# Patient Record
Sex: MALE | Race: WHITE
[De-identification: names, ages, dates, MRNs, and addresses within clinical notes are randomized per-mention and may not be internally consistent; named-entity substitution may affect disease eponyms.]

---

## 2022-01-26 ENCOUNTER — HOSPITAL ENCOUNTER (OUTPATIENT)
Dept: HOSPITAL 33 - ED | Age: 47
Setting detail: OBSERVATION
LOS: 2 days | Discharge: HOME | End: 2022-01-28
Attending: FAMILY MEDICINE | Admitting: FAMILY MEDICINE
Payer: COMMERCIAL

## 2022-01-26 DIAGNOSIS — Z20.828: ICD-10-CM

## 2022-01-26 DIAGNOSIS — J18.9: Primary | ICD-10-CM

## 2022-01-26 DIAGNOSIS — R09.02: ICD-10-CM

## 2022-01-26 DIAGNOSIS — E66.01: ICD-10-CM

## 2022-01-26 LAB
ALBUMIN SERPL-MCNC: 3.4 G/DL (ref 3.5–5)
ALP SERPL-CCNC: 48 U/L (ref 38–126)
ALT SERPL-CCNC: 21 U/L (ref 0–50)
ANION GAP SERPL CALC-SCNC: 13.9 MEQ/L (ref 5–15)
AST SERPL QL: 25 U/L (ref 17–59)
BASOPHILS # BLD AUTO: 0.01 10*3/UL (ref 0–0.4)
BILIRUB BLD-MCNC: 1.5 MG/DL (ref 0.2–1.3)
BUN SERPL-MCNC: 17 MG/DL (ref 9–20)
CALCIUM SPEC-MCNC: 8.2 MG/DL (ref 8.4–10.2)
CHLORIDE SERPL-SCNC: 97 MMOL/L (ref 98–107)
CO2 SERPL-SCNC: 24 MMOL/L (ref 22–30)
CREAT SERPL-MCNC: 0.79 MG/DL (ref 0.66–1.25)
EOSINOPHIL # BLD AUTO: 0 10*3/UL (ref 0–0.5)
FLUAV AG NPH QL IA: NEGATIVE
FLUBV AG NPH QL IA: NEGATIVE
GFR SERPLBLD BASED ON 1.73 SQ M-ARVRAT: > 60 ML/MIN
GLUCOSE SERPL-MCNC: 139 MG/DL (ref 74–106)
HCT VFR BLD AUTO: 43.1 % (ref 42–50)
HGB BLD-MCNC: 15.1 GM/DL (ref 12.5–18)
LYMPHOCYTES # SPEC AUTO: 1.05 10*3/UL (ref 1–4.6)
MCH RBC QN AUTO: 30.6 PG (ref 26–32)
MCHC RBC AUTO-ENTMCNC: 35 G/DL (ref 32–36)
MONOCYTES # BLD AUTO: 1.27 10*3/UL (ref 0–1.3)
PLATELET # BLD AUTO: 158 K/MM3 (ref 150–450)
POTASSIUM SERPLBLD-SCNC: 3 MMOL/L (ref 3.5–5.1)
PROT SERPL-MCNC: 6.8 G/DL (ref 6.3–8.2)
RBC # BLD AUTO: 4.93 M/MM3 (ref 4.1–5.6)
RSV AG SPEC QL IA: NEGATIVE
SARS-COV-2 AG RESP QL IA.RAPID: NEGATIVE
SODIUM SERPL-SCNC: 132 MMOL/L (ref 137–145)
WBC # BLD AUTO: 21 K/MM3 (ref 4–10.5)

## 2022-01-26 PROCEDURE — 85027 COMPLETE CBC AUTOMATED: CPT

## 2022-01-26 PROCEDURE — 99285 EMERGENCY DEPT VISIT HI MDM: CPT

## 2022-01-26 PROCEDURE — 36000 PLACE NEEDLE IN VEIN: CPT

## 2022-01-26 PROCEDURE — 71260 CT THORAX DX C+: CPT

## 2022-01-26 PROCEDURE — 80202 ASSAY OF VANCOMYCIN: CPT

## 2022-01-26 PROCEDURE — 36415 COLL VENOUS BLD VENIPUNCTURE: CPT

## 2022-01-26 PROCEDURE — 93268 ECG RECORD/REVIEW: CPT

## 2022-01-26 PROCEDURE — 84484 ASSAY OF TROPONIN QUANT: CPT

## 2022-01-26 PROCEDURE — 93005 ELECTROCARDIOGRAM TRACING: CPT

## 2022-01-26 PROCEDURE — 83735 ASSAY OF MAGNESIUM: CPT

## 2022-01-26 PROCEDURE — 85025 COMPLETE CBC W/AUTO DIFF WBC: CPT

## 2022-01-26 PROCEDURE — G0378 HOSPITAL OBSERVATION PER HR: HCPCS

## 2022-01-26 PROCEDURE — 0241U: CPT

## 2022-01-26 PROCEDURE — 84132 ASSAY OF SERUM POTASSIUM: CPT

## 2022-01-26 PROCEDURE — 80053 COMPREHEN METABOLIC PANEL: CPT

## 2022-01-26 PROCEDURE — 87040 BLOOD CULTURE FOR BACTERIA: CPT

## 2022-01-26 PROCEDURE — 96374 THER/PROPH/DIAG INJ IV PUSH: CPT

## 2022-01-26 PROCEDURE — 94762 N-INVAS EAR/PLS OXIMTRY CONT: CPT

## 2022-01-26 PROCEDURE — 80048 BASIC METABOLIC PNL TOTAL CA: CPT

## 2022-01-26 PROCEDURE — 93041 RHYTHM ECG TRACING: CPT

## 2022-01-26 PROCEDURE — 85379 FIBRIN DEGRADATION QUANT: CPT

## 2022-01-26 PROCEDURE — 94760 N-INVAS EAR/PLS OXIMETRY 1: CPT

## 2022-01-26 PROCEDURE — 94640 AIRWAY INHALATION TREATMENT: CPT

## 2022-01-26 PROCEDURE — 96375 TX/PRO/DX INJ NEW DRUG ADDON: CPT

## 2022-01-26 RX ADMIN — POTASSIUM CHLORIDE AND SODIUM CHLORIDE SCH MLS/HR: 900; 150 INJECTION, SOLUTION INTRAVENOUS at 20:51

## 2022-01-26 RX ADMIN — MAGNESIUM SULFATE IN DEXTROSE SCH MLS/HR: 10 INJECTION, SOLUTION INTRAVENOUS at 15:27

## 2022-01-26 RX ADMIN — VANCOMYCIN SCH MLS/HR: 2 INJECTION, SOLUTION INTRAVENOUS at 22:10

## 2022-01-26 RX ADMIN — MAGNESIUM SULFATE IN DEXTROSE SCH MLS/HR: 10 INJECTION, SOLUTION INTRAVENOUS at 14:55

## 2022-01-26 NOTE — PCM.HP
History of Present Illness





- Chief Complaint


Chief Complaint: Pneumonia, electrolye abnormalities


History of Present Illness: 


 is a 46 year old male who presented to the ER with a complaint of 2-3 

day history of cough and progressively worsening shortness of breath. He is 

morbidly obese, sees a doctor in [place] but is uncertain of his name, takes

no regular medication. has felt hot but no objective fever, cough is 

nonproductive. girlfriend is currently admitted with pneumonia and also covid 

negative.








- Review of Systems


Constitutional: Chills


Respiratory: Cough, Short Of Breath


Cardiac: No Chest Pain, No Edema, No Syncope


Abdominal/Gastrointestinal: No Abdominal Pain, No Nausea, No Vomiting, No 

Diarrhea


Skin: No Rash


All Other Systems: Reviewed and Negative





Medications & Allergies


Home Medications: 


                              Home Medication List





No Reportable Medications [No Reported Medications]  01/26/22 [History Confirmed

 01/26/22]








Allergies/Adverse Reactions: 


                                    Allergies











Allergy/AdvReac Type Severity Reaction Status Date / Time


 


No Known Drug Allergies Allergy   Unverified 01/26/22 11:47














- Past Medical History


Past Medical History: No





- Past Surgical History


Past Surgical History: No





- Social History


Smoking Status: Never smoker


Exposure to second hand smoke: No


Alcohol: None


Drug Use: none





- Physical Exam


Vital Signs: 


                               Vital Signs - 24 hr











  Temp Pulse Resp BP Pulse Ox


 


 01/26/22 16:03      94 L


 


 01/26/22 15:51      96


 


 01/26/22 15:36   100 H  20  97/71  95


 


 01/26/22 15:16   103 H  22  106/63  92 L


 


 01/26/22 14:00   108 H  20   92 L


 


 01/26/22 13:00   104 H  22  106/63  94 L


 


 01/26/22 12:57   107 H  22   93 L


 


 01/26/22 12:00   108 H  24  114/86  94 L


 


 01/26/22 11:51      96


 


 01/26/22 11:46    32 H   94 L


 


 01/26/22 11:38  97.4 F  120 H  32 H  144/89  94 L











General Appearance: no apparent distress, obese


Neurologic Exam: alert, oriented x 3, cooperative


Respiratory Exam: rhonchi (left upper lung field)


Cardiovascular Exam: regular rate/rhythm, normal heart sounds, normal peripheral

pulses


Gastrointestinal/Abdomen Exam: soft, normal bowel sounds, No tenderness, No mass


Extremity Exam: normal inspection, normal range of motion, pelvis stable


Skin Exam: normal color, warm, dry, No rash





Results





- Labs


Lab/Micro Results: 


                            Lab Results-Last 24 Hours











  01/26/22 01/26/22 01/26/22 Range/Units





  11:45 11:49 11:49 


 


WBC   21.0 H   (4.0-10.5)  K/mm3


 


RBC   4.93   (4.1-5.6)  M/mm3


 


Hgb   15.1   (12.5-18.0)  gm/dl


 


Hct   43.1   (42-50)  %


 


MCV   87.4   ()  fl


 


MCH   30.6   (26-32)  pg


 


MCHC   35.0   (32-36)  g/dl


 


RDW   13.0   (11.5-14.0)  %


 


Plt Count   158   (150-450)  K/mm3


 


MPV   11.4 H   (7.5-11.0)  fl


 


Gran %   88.9 H   (36.0-66.0)  %


 


Eos # (Auto)   0   (0-0.5)  


 


Absolute Lymphs (auto)   1.05   (1.0-4.6)  


 


Absolute Monos (auto)   1.27   (0.0-1.3)  


 


Lymphocytes %   5.0 L   (24.0-44.0)  %


 


Monocytes %   6.1   (0.0-12.0)  %


 


Eosinophils %   0.0   (0.00-5.0)  %


 


Basophils %   0.0   (0.0-0.4)  %


 


Absolute Granulocytes   18.63 H   (1.4-6.9)  


 


Basophils #   0.01   (0-0.4)  


 


D-Dimer     (215-500)  ng/mL


 


Sodium    132 L  (137-145)  mmol/L


 


Potassium    3.0 L*  (3.5-5.1)  mmol/L


 


Chloride    97 L  ()  mmol/L


 


Carbon Dioxide    24  (22-30)  mmol/L


 


Anion Gap    13.9  (5-15)  MEQ/L


 


BUN    17  (9-20)  mg/dL


 


Creatinine    0.79  (0.66-1.25)  mg/dL


 


Estimated GFR    > 60.0  ML/MIN


 


Glucose    139 H  ()  mg/dL


 


Calcium    8.2 L  (8.4-10.2)  mg/dL


 


Magnesium  1.8    (1.6-2.3)  mg/dL


 


Total Bilirubin    1.50 H  (0.2-1.3)  mg/dL


 


AST    25  (17-59)  U/L


 


ALT    21  (0-50)  U/L


 


Alkaline Phosphatase    48  ()  U/L


 


Troponin I     (0.000-0.034)  ng/mL


 


Serum Total Protein    6.8  (6.3-8.2)  g/dL


 


Albumin    3.4 L  (3.5-5.0)  g/dL


 


Influenza Type A Ag     (NEGATIVE)  


 


Influenza Type B Ag     (NEGATIVE)  


 


RSV (PCR)     (Negative)  


 


SARS-CoV-2 (PCR)     (NEGATIVE)  














  01/26/22 01/26/22 01/26/22 Range/Units





  11:49 11:49 14:11 


 


WBC     (4.0-10.5)  K/mm3


 


RBC     (4.1-5.6)  M/mm3


 


Hgb     (12.5-18.0)  gm/dl


 


Hct     (42-50)  %


 


MCV     ()  fl


 


MCH     (26-32)  pg


 


MCHC     (32-36)  g/dl


 


RDW     (11.5-14.0)  %


 


Plt Count     (150-450)  K/mm3


 


MPV     (7.5-11.0)  fl


 


Gran %     (36.0-66.0)  %


 


Eos # (Auto)     (0-0.5)  


 


Absolute Lymphs (auto)     (1.0-4.6)  


 


Absolute Monos (auto)     (0.0-1.3)  


 


Lymphocytes %     (24.0-44.0)  %


 


Monocytes %     (0.0-12.0)  %


 


Eosinophils %     (0.00-5.0)  %


 


Basophils %     (0.0-0.4)  %


 


Absolute Granulocytes     (1.4-6.9)  


 


Basophils #     (0-0.4)  


 


D-Dimer  2124 H*    (215-500)  ng/mL


 


Sodium     (137-145)  mmol/L


 


Potassium     (3.5-5.1)  mmol/L


 


Chloride     ()  mmol/L


 


Carbon Dioxide     (22-30)  mmol/L


 


Anion Gap     (5-15)  MEQ/L


 


BUN     (9-20)  mg/dL


 


Creatinine     (0.66-1.25)  mg/dL


 


Estimated GFR     ML/MIN


 


Glucose     ()  mg/dL


 


Calcium     (8.4-10.2)  mg/dL


 


Magnesium     (1.6-2.3)  mg/dL


 


Total Bilirubin     (0.2-1.3)  mg/dL


 


AST     (17-59)  U/L


 


ALT     (0-50)  U/L


 


Alkaline Phosphatase     ()  U/L


 


Troponin I   < 0.012   (0.000-0.034)  ng/mL


 


Serum Total Protein     (6.3-8.2)  g/dL


 


Albumin     (3.5-5.0)  g/dL


 


Influenza Type A Ag    NEGATIVE  (NEGATIVE)  


 


Influenza Type B Ag    NEGATIVE  (NEGATIVE)  


 


RSV (PCR)    NEGATIVE  (Negative)  


 


SARS-CoV-2 (PCR)    NEGATIVE  (NEGATIVE)  














  01/26/22 Range/Units





  14:19 


 


WBC   (4.0-10.5)  K/mm3


 


RBC   (4.1-5.6)  M/mm3


 


Hgb   (12.5-18.0)  gm/dl


 


Hct   (42-50)  %


 


MCV   ()  fl


 


MCH   (26-32)  pg


 


MCHC   (32-36)  g/dl


 


RDW   (11.5-14.0)  %


 


Plt Count   (150-450)  K/mm3


 


MPV   (7.5-11.0)  fl


 


Gran %   (36.0-66.0)  %


 


Eos # (Auto)   (0-0.5)  


 


Absolute Lymphs (auto)   (1.0-4.6)  


 


Absolute Monos (auto)   (0.0-1.3)  


 


Lymphocytes %   (24.0-44.0)  %


 


Monocytes %   (0.0-12.0)  %


 


Eosinophils %   (0.00-5.0)  %


 


Basophils %   (0.0-0.4)  %


 


Absolute Granulocytes   (1.4-6.9)  


 


Basophils #   (0-0.4)  


 


D-Dimer   (215-500)  ng/mL


 


Sodium   (137-145)  mmol/L


 


Potassium   (3.5-5.1)  mmol/L


 


Chloride   ()  mmol/L


 


Carbon Dioxide   (22-30)  mmol/L


 


Anion Gap   (5-15)  MEQ/L


 


BUN   (9-20)  mg/dL


 


Creatinine   (0.66-1.25)  mg/dL


 


Estimated GFR   ML/MIN


 


Glucose   ()  mg/dL


 


Calcium   (8.4-10.2)  mg/dL


 


Magnesium   (1.6-2.3)  mg/dL


 


Total Bilirubin   (0.2-1.3)  mg/dL


 


AST   (17-59)  U/L


 


ALT   (0-50)  U/L


 


Alkaline Phosphatase   ()  U/L


 


Troponin I  < 0.012  (0.000-0.034)  ng/mL


 


Serum Total Protein   (6.3-8.2)  g/dL


 


Albumin   (3.5-5.0)  g/dL


 


Influenza Type A Ag   (NEGATIVE)  


 


Influenza Type B Ag   (NEGATIVE)  


 


RSV (PCR)   (Negative)  


 


SARS-CoV-2 (PCR)   (NEGATIVE)  














- Radiology Impressions


Radiology Exams & Impressions: 


                              Radiology Procedures











 Category Date Time Status


 


 CHEST WITH CONTRAST [CT] Stat Exams  01/26/22 12:16 Completed














Assessment/Plan


(1) Pneumonia


Current Visit: Yes   Status: Acute   


Assessment & Plan: 


continue vanc and zosyn, will monitor for clinical improvement. due to positive 

sick contact will cover for MRSA with vanc and zosyn for pseudomonas coverage in

 additional to typical CAP pathogens.


Code(s): J18.9 - PNEUMONIA, UNSPECIFIED ORGANISM   





(2) Hypoxia


Current Visit: Yes   Status: Acute   Code(s): R09.02 - HYPOXEMIA

## 2022-01-26 NOTE — ERPHSYRPT
- History of Present Illness


Time Seen by Provider: 01/26/22 11:45


Source: patient


Exam Limitations: no limitations


Patient Subjective Stated Complaint: pt here for increase sob for 2 days , with 

loose stools, , pain with a deep breath, hes girlfriend is ill and in hospital


Triage Nursing Assessment: pt alert, face mask in place, resp labored with 

excertion, skin w/d/p. no cough


Physician History: 


Patient is a 46-year-old male presents to our ED for evaluation of shortness of 

breath. Shortness of breath has been ongoing for approximately 2 days. Patient 

describes shortness of breath is progressively worsening. Shortness of breath 

associated with pain upon deep inspiration. He is also experiencing loose 

stools. Patient states his girlfriend recently diagnosed with bronchitis. 

Patient appears labored with exertion. No active chest pain. Symptoms are 

progressive. Symptoms are moderate in intensity. No specific worsening improving

factors. No obvious Covid exposures. Patient is vaccinated for Covid. Patient 

voices no other complaints concerns at this time.





Timing/Duration: day(s) (2 days)


Severity: moderate


Modifying Factors: Improves With: other (Exertion worsens symptoms.)


Associated Symptoms: other (Shortness of breath), No vomiting, No abdominal 

pain, No chest pain


Allergies/Adverse Reactions: 








No Known Drug Allergies Allergy (Unverified 01/26/22 11:47)


   





Home Medications: 








No Reportable Medications [No Reported Medications]  01/26/22 [History]





Hx Tetanus, Diphtheria Vaccination/Date Given: No


Hx Influenza Vaccination/Date Given: No


Hx Pneumococcal Vaccination/Date Given: No


Immunizations Up to Date: Yes





Travel Risk





- International Travel


Have you traveled outside of the country in past 3 weeks: No





- Coronavirus Screening


Are you exhibiting any of the following symptoms?: Yes


Symptoms: Shortness of Breath, Vomiting/Diarrhea, Headaches/Body Aches/Fatigue


Close contact with a COVID-19 positive Pt in past 14-21 Days: No





- Vaccine Status


Have you recieved a Covid-19 vaccination: Yes


: Pfizer





- Vaccination Dates


Date of 2cond Vaccination (if applicable): ?





- Review of Systems


Constitutional: No Symptoms, No Fever, No Chills


Eyes: No Symptoms


Ears, Nose, & Throat: No Symptoms


Respiratory: No Symptoms, No Cough, No Dyspnea


Cardiac: No Symptoms, No Chest Pain, No Edema, No Syncope


Abdominal/Gastrointestinal: No Symptoms, No Abdominal Pain, No Nausea, No 

Vomiting, No Diarrhea


Genitourinary Symptoms: No Symptoms, No Dysuria


Musculoskeletal: No Symptoms, No Back Pain, No Neck Pain


Skin: No Symptoms, No Rash


Neurological: No Symptoms, No Dizziness, No Focal Weakness, No Sensory Changes


Psychological: No Symptoms


Endocrine: No Symptoms


Hematologic/Lymphatic: No Symptoms


Immunological/Allergic: No Symptoms


All Other Systems: Reviewed and Negative





- Past Medical History


Pertinent Past Medical History: No





- Past Surgical History


Past Surgical History: No





- Social History


Smoking Status: Never smoker


Exposure to second hand smoke: No


Drug Use: none


Patient Lives Alone: No





- Nursing Vital Signs


Nursing Vital Signs: 


                               Initial Vital Signs











Temperature  97.4 F   01/26/22 11:38


 


Pulse Rate  120 H  01/26/22 11:38


 


Respiratory Rate  32 H  01/26/22 11:38


 


Blood Pressure  144/89   01/26/22 11:38


 


O2 Sat by Pulse Oximetry  94 L  01/26/22 11:38








                                   Pain Scale











Pain Intensity                 0

















- Physical Exam


General Appearance: no apparent distress, alert


Eye Exam: PERRL/EOMI, eyes nml inspection


Ears, Nose, Throat Exam: normal ENT inspection, TMs normal, pharynx normal, 

moist mucous membranes


Neck Exam: normal inspection, non-tender, supple, full range of motion


Respiratory Exam: normal breath sounds, lungs clear, respiratory distress (Mild 

respiratory distress with labored breathing.), airway intact, No chest 

tenderness


Cardiovascular Exam: regular rate/rhythm, normal heart sounds, normal peripheral

pulses


Gastrointestinal/Abdomen Exam: soft, normal bowel sounds, No tenderness, No mass


Back Exam: normal inspection, normal range of motion, No CVA tenderness, No 

vertebral tenderness


Extremity Exam: normal inspection, normal range of motion, pelvis stable


Neurologic Exam: alert, oriented x 3, cooperative, normal mood/affect, nml 

cerebellar function, nml station & gait, sensation nml, No motor deficits


Skin Exam: normal color, warm, dry, No rash


Lymphatic Exam: No adenopathy


**SpO2 Interpretation**: normal


SpO2: 96


O2 Delivery: Room Air





- Course


Nursing assessment & vital signs reviewed: Yes


EKG Interpreted by Me: RATE (120), Sinus Rhythm, Right Axis Deviation, NORMAL 

INTERVALS





- CT Exams


  ** Chest


CT Interpretation: Tele-radiologist Report (Pulmonary embolus evaluation limited

by poor contrast opacification.  No obvious central pulmonary embolus.  Left up

per lobe consolidating pneumonia.  Incidental fatty liver and splenomegaly.  

Heart not enlarged.  Aorta and normal course and caliber.  No pathologic 

mediastinal lymphadenopathy.)


Ordered Tests: 


                               Active Orders 24 hr











 Category Date Time Status


 


 Cardiac Monitor STAT Care  01/26/22 11:47 Active


 


 EKG-ER Only STAT Care  01/26/22 11:46 Active


 


 IV Insertion STAT Care  01/26/22 11:46 Active


 


 IV Insertion-2nd Peripheral STAT Care  01/26/22 14:16 Active


 


 Oxygen-ED Only Nasal Cannula 2 lpm Care  01/26/22 15:30 Active


 


 Pulse Oximetry (ED) STAT Care  01/26/22 11:46 Active


 


 CHEST WITH CONTRAST [CT] Stat Exams  01/26/22 12:16 Completed


 


 BLOOD CULTURE Stat Lab  01/26/22 12:01 Ordered


 


 CBC W DIFF Stat Lab  01/26/22 11:49 Completed


 


 CMP Stat Lab  01/26/22 11:49 Completed


 


 D-DIMER QUANTITATIVE Stat Lab  01/26/22 11:49 Completed


 


 MAG [MAGNESIUM] Stat Lab  01/26/22 11:45 Completed


 


 TROPONIN Q3H Lab  01/26/22 11:49 Completed


 


 TROPONIN Q3H Lab  01/26/22 14:19 Received


 


 TROPONIN Q3H Lab  01/26/22 18:00 Ordered


 


 TROPONIN Q3H Lab  01/26/22 21:00 Ordered


 


 TROPONIN Q3H Lab  01/27/22 00:00 Ordered


 


 Respiratory Therapy Assessment ONCE RT  01/26/22 12:57 Completed


 


 Transfer Order Routine Transfer  01/26/22 Ordered








Medication Summary











Generic Name Dose Route Start Last Admin





  Trade Name Freq  PRN Reason Stop Dose Admin


 


Potassium Chloride  20 meq in 100 mls @ 50 mls/hr  01/26/22 14:15  01/26/22 

14:25





  Potassium Chloride 20 Meq In Water 100ml  IV  01/26/22 18:14  50 mls/hr





  Q2H TINO   Administration


 


Sodium Chloride  1,000 mls @ 100 mls/hr  01/26/22 14:15  01/26/22 14:24





  Sodium Chloride 0.9% 1000 Ml  IV  02/25/22 14:14  100 mls/hr





  .Q10H TINO   Administration


 


Magnesium Sulfate/Dextrose  100 mls @ 100 mls/hr  01/26/22 14:45  01/26/22 15:27





  Magnesium 1 Gm / 100 Ml D5w***  IV  01/26/22 16:44  100 mls/hr





  Q1H TINO   Administration














Discontinued Medications














Generic Name Dose Route Start Last Admin





  Trade Name Susanne  PRN Reason Stop Dose Admin


 


Albuterol/Ipratropium  3 ml  01/26/22 12:01  01/26/22 12:53





  Ipratropium/Albuterol Sulfate 3 Ml Ampul.Neb  IH  01/26/22 12:02  3 ml





  STAT ONE   Administration


 


Albuterol/Ipratropium  Confirm  01/26/22 12:50 





  Ipratropium/Albuterol Sulfate 3 Ml Ampul.Neb  Administered  01/26/22 12:51 





  Dose  





  3 ml  





  IH  





  .STK-MED ONE  


 


Calcium Gluconate  1,000 mg  01/26/22 14:06  01/26/22 14:24





  Calcium Gluconate 1000 Mg/10 Ml Vial  IV  01/26/22 14:07  1,000 mg





  STAT ONE   Administration


 


Calcium Gluconate  Confirm  01/26/22 14:17 





  Calcium Gluconate 1000 Mg/10 Ml Vial  Administered  01/26/22 14:18 





  Dose  





  1,000 mg  





  IV  





  .STK-MED ONE  


 


Methylprednisolone Sodium  0 mg  01/26/22 12:01  01/26/22 12:04





Succinate 125 mg/ Sterile  IV  01/26/22 12:02  125 mg





Water 2 ml  STAT ONE   Administration


 


Vancomycin HCl  1 gm in 200 mls @ 125 mls/hr  01/26/22 14:03  01/26/22 14:50





  Vancomycin 1 Gram/200 Ml Bag  IV  01/26/22 15:38  125 ml/hr





  STAT ONE   125 mls/hr





    Administration


 


Piperacillin Sod/Tazobactam  100 mls @ 200 mls/hr  01/26/22 14:04  01/26/22 

14:25





  Sod 3.375 gm/ Sodium Chloride  IV  01/26/22 14:33  200 mls/hr





  STAT ONE   Administration


 


Sodium Chloride  Confirm  01/26/22 14:18 





  Sodium Chloride 100ml Mini-Bag Plus  Administered  01/26/22 14:19 





  Dose  





  100 mls @ ud  





  IV  





  .STK-MED ONE  


 


Vancomycin HCl  Confirm  01/26/22 14:49 





  Vancomycin 1 Gram/200 Ml Bag  Administered  01/26/22 14:50 





  Dose  





  1 gm in 200 mls @ ud  





  IV  





  .STK-MED ONE  


 


Methylprednisolone Sodium Succinate  Confirm  01/26/22 12:03 





  Methylprednis Sod Succ 125 Mg/2 Ml Vial***  Administered  01/26/22 12:04 





  Dose  





  125 mg  





  .ROUTE  





  .STK-MED ONE  


 


Piperacillin Sod/Tazobactam Sod  Confirm  01/26/22 14:17 





  Piperacillin/Tazobactam Sodium 3.375 Gm Vial  Administered  01/26/22 14:18 





  Dose  





  3.375 gm  





  IV  





  .STK-MED ONE  


 


Sterile Water  Confirm  01/26/22 12:03 





  Water For Injection,Sterile 10 Ml Vial  Administered  01/26/22 12:04 





  Dose  





  10 ml  





  IJ  





  .STK-MED ONE  











Lab/Rad Data: 


                           Laboratory Result Diagrams





                                 01/26/22 11:49 





                                 01/26/22 11:49 





                               Laboratory Results











  01/26/22 01/26/22 01/26/22 Range/Units





  14:11 11:49 11:49 


 


WBC     (4.0-10.5)  K/mm3


 


RBC     (4.1-5.6)  M/mm3


 


Hgb     (12.5-18.0)  gm/dl


 


Hct     (42-50)  %


 


MCV     ()  fl


 


MCH     (26-32)  pg


 


MCHC     (32-36)  g/dl


 


RDW     (11.5-14.0)  %


 


Plt Count     (150-450)  K/mm3


 


MPV     (7.5-11.0)  fl


 


Gran %     (36.0-66.0)  %


 


Eos # (Auto)     (0-0.5)  


 


Absolute Lymphs (auto)     (1.0-4.6)  


 


Absolute Monos (auto)     (0.0-1.3)  


 


Lymphocytes %     (24.0-44.0)  %


 


Monocytes %     (0.0-12.0)  %


 


Eosinophils %     (0.00-5.0)  %


 


Basophils %     (0.0-0.4)  %


 


Absolute Granulocytes     (1.4-6.9)  


 


Basophils #     (0-0.4)  


 


D-Dimer    2124 H*  (215-500)  ng/mL


 


Sodium     (137-145)  mmol/L


 


Potassium     (3.5-5.1)  mmol/L


 


Chloride     ()  mmol/L


 


Carbon Dioxide     (22-30)  mmol/L


 


Anion Gap     (5-15)  MEQ/L


 


BUN     (9-20)  mg/dL


 


Creatinine     (0.66-1.25)  mg/dL


 


Estimated GFR     ML/MIN


 


Glucose     ()  mg/dL


 


Calcium     (8.4-10.2)  mg/dL


 


Magnesium     (1.6-2.3)  mg/dL


 


Total Bilirubin     (0.2-1.3)  mg/dL


 


AST     (17-59)  U/L


 


ALT     (0-50)  U/L


 


Alkaline Phosphatase     ()  U/L


 


Troponin I   < 0.012   (0.000-0.034)  ng/mL


 


Serum Total Protein     (6.3-8.2)  g/dL


 


Albumin     (3.5-5.0)  g/dL


 


Influenza Type A Ag  NEGATIVE    (NEGATIVE)  


 


Influenza Type B Ag  NEGATIVE    (NEGATIVE)  


 


RSV (PCR)  NEGATIVE    (Negative)  


 


SARS-CoV-2 (PCR)  NEGATIVE    (NEGATIVE)  














  01/26/22 01/26/22 01/26/22 Range/Units





  11:49 11:49 11:45 


 


WBC   21.0 H   (4.0-10.5)  K/mm3


 


RBC   4.93   (4.1-5.6)  M/mm3


 


Hgb   15.1   (12.5-18.0)  gm/dl


 


Hct   43.1   (42-50)  %


 


MCV   87.4   ()  fl


 


MCH   30.6   (26-32)  pg


 


MCHC   35.0   (32-36)  g/dl


 


RDW   13.0   (11.5-14.0)  %


 


Plt Count   158   (150-450)  K/mm3


 


MPV   11.4 H   (7.5-11.0)  fl


 


Gran %   88.9 H   (36.0-66.0)  %


 


Eos # (Auto)   0   (0-0.5)  


 


Absolute Lymphs (auto)   1.05   (1.0-4.6)  


 


Absolute Monos (auto)   1.27   (0.0-1.3)  


 


Lymphocytes %   5.0 L   (24.0-44.0)  %


 


Monocytes %   6.1   (0.0-12.0)  %


 


Eosinophils %   0.0   (0.00-5.0)  %


 


Basophils %   0.0   (0.0-0.4)  %


 


Absolute Granulocytes   18.63 H   (1.4-6.9)  


 


Basophils #   0.01   (0-0.4)  


 


D-Dimer     (215-500)  ng/mL


 


Sodium  132 L    (137-145)  mmol/L


 


Potassium  3.0 L*    (3.5-5.1)  mmol/L


 


Chloride  97 L    ()  mmol/L


 


Carbon Dioxide  24    (22-30)  mmol/L


 


Anion Gap  13.9    (5-15)  MEQ/L


 


BUN  17    (9-20)  mg/dL


 


Creatinine  0.79    (0.66-1.25)  mg/dL


 


Estimated GFR  > 60.0    ML/MIN


 


Glucose  139 H    ()  mg/dL


 


Calcium  8.2 L    (8.4-10.2)  mg/dL


 


Magnesium    1.8  (1.6-2.3)  mg/dL


 


Total Bilirubin  1.50 H    (0.2-1.3)  mg/dL


 


AST  25    (17-59)  U/L


 


ALT  21    (0-50)  U/L


 


Alkaline Phosphatase  48    ()  U/L


 


Troponin I     (0.000-0.034)  ng/mL


 


Serum Total Protein  6.8    (6.3-8.2)  g/dL


 


Albumin  3.4 L    (3.5-5.0)  g/dL


 


Influenza Type A Ag     (NEGATIVE)  


 


Influenza Type B Ag     (NEGATIVE)  


 


RSV (PCR)     (Negative)  


 


SARS-CoV-2 (PCR)     (NEGATIVE)  














- Progress


Progress: improved


Progress Note: 


Leukocytosis  


01/26/22 13:58


Patient has a left upper lobe consolidating pneumonia.  Blood cultures obtained 

antibiotics administered.  Multiple electrolyte abnormalities including 

hypokalemia hyponatremia hypocalcemia.  Patient is in mild respiratory distress 

with tachypnea worse with exertion.  Patient received breathing treatment.  He 

feels better.  Patient will require IV antibiotics.  Case discussed Dr. Castillo 

accepts admission to observation.





Portions of this note were created with voice recognition technology.  There may

 be grammatical, spelling, punctuation or sound alike errors


01/26/22 15:47





Discussed with .: Ismael


Will see patient in: hospital (observation)


Counseled pt/family regarding: lab results, diagnosis, rad results





- Departure


Departure Disposition: Observation


Clinical Impression: 


 Hyponatremia, Hypokalemia, Hypocalcemia, Shortness of breath, Leukocytosis, 

Pneumonia, Electrolyte abnormality





Condition: Stable


Critical Care Time: No


Referrals: 


DOCTOR,NO FAMILY [Primary Care Provider] - Follow up/PCP as directed

## 2022-01-26 NOTE — XRAY
Indication: Left chest pain, short of breath, and diarrhea.



Multiple contiguous axial images obtained through the chest using 100 cc

Isovue 370 contrast and PE protocol.



Comparison: None



There is poor opacification of the pulmonary arteries limiting evaluation for

pulmonary embolus.  No obvious central pulmonary embolus.  Heart not enlarged.

 Aorta is normal in course and caliber.  No pathologic mediastinal/hilar

lymphadenopathy.  Lungs demonstrate large focus left upper lobe consolidating

pneumonia.  Remaining lungs are clear.



Bony thorax intact with mild degenerative changes throughout the spine.

Limited upper abdomen demonstrates fatty liver and 16.1 cm splenomegaly.



Impression:

1.  Pulmonary embolus evaluation limited by poor contrast opacification.  No

obvious central pulmonary embolus.

2.  Left upper lobe consolidating pneumonia.

3.  Incidental fatty liver and splenomegaly.

## 2022-01-27 LAB
ALBUMIN SERPL-MCNC: 3.2 G/DL (ref 3.5–5)
ALP SERPL-CCNC: 48 U/L (ref 38–126)
ALT SERPL-CCNC: 20 U/L (ref 0–50)
ANION GAP SERPL CALC-SCNC: 12.7 MEQ/L (ref 5–15)
AST SERPL QL: 22 U/L (ref 17–59)
BILIRUB BLD-MCNC: 0.7 MG/DL (ref 0.2–1.3)
BUN SERPL-MCNC: 18 MG/DL (ref 9–20)
CALCIUM SPEC-MCNC: 8.4 MG/DL (ref 8.4–10.2)
CHLORIDE SERPL-SCNC: 102 MMOL/L (ref 98–107)
CO2 SERPL-SCNC: 27 MMOL/L (ref 22–30)
CREAT SERPL-MCNC: 0.67 MG/DL (ref 0.66–1.25)
GFR SERPLBLD BASED ON 1.73 SQ M-ARVRAT: > 60 ML/MIN
GLUCOSE SERPL-MCNC: 172 MG/DL (ref 74–106)
HCT VFR BLD AUTO: 44.5 % (ref 42–50)
HGB BLD-MCNC: 15.2 GM/DL (ref 12.5–18)
MCH RBC QN AUTO: 30.3 PG (ref 26–32)
MCHC RBC AUTO-ENTMCNC: 34.2 G/DL (ref 32–36)
PLATELET # BLD AUTO: 164 K/MM3 (ref 150–450)
POTASSIUM SERPLBLD-SCNC: 3.6 MMOL/L (ref 3.5–5.1)
PROT SERPL-MCNC: 6.5 G/DL (ref 6.3–8.2)
RBC # BLD AUTO: 5.02 M/MM3 (ref 4.1–5.6)
SODIUM SERPL-SCNC: 139 MMOL/L (ref 137–145)
WBC # BLD AUTO: 16.8 K/MM3 (ref 4–10.5)

## 2022-01-27 RX ADMIN — POTASSIUM CHLORIDE AND SODIUM CHLORIDE SCH MLS/HR: 900; 150 INJECTION, SOLUTION INTRAVENOUS at 09:50

## 2022-01-27 RX ADMIN — CEFEPIME HYDROCHLORIDE SCH MLS/HR: 2 INJECTION, POWDER, FOR SOLUTION INTRAVENOUS at 01:12

## 2022-01-27 RX ADMIN — CEFEPIME HYDROCHLORIDE SCH MLS/HR: 2 INJECTION, POWDER, FOR SOLUTION INTRAVENOUS at 11:20

## 2022-01-27 RX ADMIN — POTASSIUM CHLORIDE AND SODIUM CHLORIDE SCH MLS/HR: 900; 150 INJECTION, SOLUTION INTRAVENOUS at 21:00

## 2022-01-27 RX ADMIN — VANCOMYCIN SCH MLS/HR: 2 INJECTION, SOLUTION INTRAVENOUS at 07:41

## 2022-01-27 RX ADMIN — CEFEPIME HYDROCHLORIDE SCH MLS/HR: 2 INJECTION, POWDER, FOR SOLUTION INTRAVENOUS at 17:45

## 2022-01-27 RX ADMIN — VANCOMYCIN SCH MLS/HR: 2 INJECTION, SOLUTION INTRAVENOUS at 14:34

## 2022-01-27 RX ADMIN — CEFEPIME HYDROCHLORIDE SCH MLS/HR: 2 INJECTION, POWDER, FOR SOLUTION INTRAVENOUS at 06:46

## 2022-01-27 RX ADMIN — VANCOMYCIN SCH MLS/HR: 2 INJECTION, SOLUTION INTRAVENOUS at 20:57

## 2022-01-27 NOTE — PCM.NOTE
Date and Time: 01/27/22 0912





Subjective Assessment: 





patient feeling much better today, he is on room air. cough is improved and he 

denies shortness of breath this morning





Objective Exam


General Appearance: no apparent distress


Neurologic Exam: alert, oriented x 3


Respiratory Exam: rhonchi, No respiratory distress


Cardiovascular Exam: regular rate/rhythm, normal heart sounds


Gastrointestinal/Abdomen Exam: soft, No tenderness, No mass


Extremity Exam: normal inspection, normal range of motion





OBJECTIVE DATA


Vital Signs: 


                               Vital Signs - 24 hr











  Temp Pulse Resp BP Pulse Ox


 


 01/27/22 08:00  98.1 F  85  27 H  126/76  91 L


 


 01/27/22 04:00  82 F  96 H  20  115/70  95


 


 01/26/22 23:54  98.1 F  96 H  18  109/60  94 L


 


 01/26/22 19:48  97.9 F  97 H  20  122/73  91 L


 


 01/26/22 19:16   75  16   91 L


 


 01/26/22 17:06      95


 


 01/26/22 17:03   96 H  20   94 L


 


 01/26/22 16:30  98.1 F  97 H  22  134/76  94 L


 


 01/26/22 16:03      94 L


 


 01/26/22 15:51      96


 


 01/26/22 15:36   100 H  20  97/71  95


 


 01/26/22 15:16   103 H  22  106/63  92 L


 


 01/26/22 14:00   108 H  20   92 L


 


 01/26/22 13:00   104 H  22  106/63  94 L


 


 01/26/22 12:57   107 H  22   93 L


 


 01/26/22 12:00   108 H  24  114/86  94 L


 


 01/26/22 11:51      96


 


 01/26/22 11:46    32 H   94 L


 


 01/26/22 11:38  97.4 F  120 H  32 H  144/89  94 L








                        Pain Assessment - Last Documented











Pain Intensity                 0











Intake and Output: 


                                 Intake & Output











 01/24/22 01/25/22 01/26/22 01/27/22





 11:59 11:59 11:59 11:59


 


Intake Total    1180


 


Output Total    800


 


Balance    380


 


Weight   170 kg 164.3 kg











Lab Results: 


                            Lab Results-Last 24 Hours











  01/26/22 01/26/22 01/26/22 Range/Units





  11:45 11:49 11:49 


 


WBC   21.0 H   (4.0-10.5)  K/mm3


 


RBC   4.93   (4.1-5.6)  M/mm3


 


Hgb   15.1   (12.5-18.0)  gm/dl


 


Hct   43.1   (42-50)  %


 


MCV   87.4   ()  fl


 


MCH   30.6   (26-32)  pg


 


MCHC   35.0   (32-36)  g/dl


 


RDW   13.0   (11.5-14.0)  %


 


Plt Count   158   (150-450)  K/mm3


 


MPV   11.4 H   (7.5-11.0)  fl


 


Gran %   88.9 H   (36.0-66.0)  %


 


Eos # (Auto)   0   (0-0.5)  


 


Absolute Lymphs (auto)   1.05   (1.0-4.6)  


 


Absolute Monos (auto)   1.27   (0.0-1.3)  


 


Lymphocytes %   5.0 L   (24.0-44.0)  %


 


Monocytes %   6.1   (0.0-12.0)  %


 


Eosinophils %   0.0   (0.00-5.0)  %


 


Basophils %   0.0   (0.0-0.4)  %


 


Absolute Granulocytes   18.63 H   (1.4-6.9)  


 


Basophils #   0.01   (0-0.4)  


 


D-Dimer     (215-500)  ng/mL


 


Sodium    132 L  (137-145)  mmol/L


 


Potassium    3.0 L*  (3.5-5.1)  mmol/L


 


Chloride    97 L  ()  mmol/L


 


Carbon Dioxide    24  (22-30)  mmol/L


 


Anion Gap    13.9  (5-15)  MEQ/L


 


BUN    17  (9-20)  mg/dL


 


Creatinine    0.79  (0.66-1.25)  mg/dL


 


Estimated GFR    > 60.0  ML/MIN


 


Glucose    139 H  ()  mg/dL


 


Calcium    8.2 L  (8.4-10.2)  mg/dL


 


Magnesium  1.8    (1.6-2.3)  mg/dL


 


Total Bilirubin    1.50 H  (0.2-1.3)  mg/dL


 


AST    25  (17-59)  U/L


 


ALT    21  (0-50)  U/L


 


Alkaline Phosphatase    48  ()  U/L


 


Troponin I     (0.000-0.034)  ng/mL


 


Serum Total Protein    6.8  (6.3-8.2)  g/dL


 


Albumin    3.4 L  (3.5-5.0)  g/dL


 


Influenza Type A Ag     (NEGATIVE)  


 


Influenza Type B Ag     (NEGATIVE)  


 


RSV (PCR)     (Negative)  


 


SARS-CoV-2 (PCR)     (NEGATIVE)  














  01/26/22 01/26/22 01/26/22 Range/Units





  11:49 11:49 14:11 


 


WBC     (4.0-10.5)  K/mm3


 


RBC     (4.1-5.6)  M/mm3


 


Hgb     (12.5-18.0)  gm/dl


 


Hct     (42-50)  %


 


MCV     ()  fl


 


MCH     (26-32)  pg


 


MCHC     (32-36)  g/dl


 


RDW     (11.5-14.0)  %


 


Plt Count     (150-450)  K/mm3


 


MPV     (7.5-11.0)  fl


 


Gran %     (36.0-66.0)  %


 


Eos # (Auto)     (0-0.5)  


 


Absolute Lymphs (auto)     (1.0-4.6)  


 


Absolute Monos (auto)     (0.0-1.3)  


 


Lymphocytes %     (24.0-44.0)  %


 


Monocytes %     (0.0-12.0)  %


 


Eosinophils %     (0.00-5.0)  %


 


Basophils %     (0.0-0.4)  %


 


Absolute Granulocytes     (1.4-6.9)  


 


Basophils #     (0-0.4)  


 


D-Dimer  2124 H*    (215-500)  ng/mL


 


Sodium     (137-145)  mmol/L


 


Potassium     (3.5-5.1)  mmol/L


 


Chloride     ()  mmol/L


 


Carbon Dioxide     (22-30)  mmol/L


 


Anion Gap     (5-15)  MEQ/L


 


BUN     (9-20)  mg/dL


 


Creatinine     (0.66-1.25)  mg/dL


 


Estimated GFR     ML/MIN


 


Glucose     ()  mg/dL


 


Calcium     (8.4-10.2)  mg/dL


 


Magnesium     (1.6-2.3)  mg/dL


 


Total Bilirubin     (0.2-1.3)  mg/dL


 


AST     (17-59)  U/L


 


ALT     (0-50)  U/L


 


Alkaline Phosphatase     ()  U/L


 


Troponin I   < 0.012   (0.000-0.034)  ng/mL


 


Serum Total Protein     (6.3-8.2)  g/dL


 


Albumin     (3.5-5.0)  g/dL


 


Influenza Type A Ag    NEGATIVE  (NEGATIVE)  


 


Influenza Type B Ag    NEGATIVE  (NEGATIVE)  


 


RSV (PCR)    NEGATIVE  (Negative)  


 


SARS-CoV-2 (PCR)    NEGATIVE  (NEGATIVE)  














  01/26/22 01/26/22 01/26/22 Range/Units





  14:19 18:08 21:00 


 


WBC     (4.0-10.5)  K/mm3


 


RBC     (4.1-5.6)  M/mm3


 


Hgb     (12.5-18.0)  gm/dl


 


Hct     (42-50)  %


 


MCV     ()  fl


 


MCH     (26-32)  pg


 


MCHC     (32-36)  g/dl


 


RDW     (11.5-14.0)  %


 


Plt Count     (150-450)  K/mm3


 


MPV     (7.5-11.0)  fl


 


Gran %     (36.0-66.0)  %


 


Eos # (Auto)     (0-0.5)  


 


Absolute Lymphs (auto)     (1.0-4.6)  


 


Absolute Monos (auto)     (0.0-1.3)  


 


Lymphocytes %     (24.0-44.0)  %


 


Monocytes %     (0.0-12.0)  %


 


Eosinophils %     (0.00-5.0)  %


 


Basophils %     (0.0-0.4)  %


 


Absolute Granulocytes     (1.4-6.9)  


 


Basophils #     (0-0.4)  


 


D-Dimer     (215-500)  ng/mL


 


Sodium     (137-145)  mmol/L


 


Potassium     (3.5-5.1)  mmol/L


 


Chloride     ()  mmol/L


 


Carbon Dioxide     (22-30)  mmol/L


 


Anion Gap     (5-15)  MEQ/L


 


BUN     (9-20)  mg/dL


 


Creatinine     (0.66-1.25)  mg/dL


 


Estimated GFR     ML/MIN


 


Glucose     ()  mg/dL


 


Calcium     (8.4-10.2)  mg/dL


 


Magnesium     (1.6-2.3)  mg/dL


 


Total Bilirubin     (0.2-1.3)  mg/dL


 


AST     (17-59)  U/L


 


ALT     (0-50)  U/L


 


Alkaline Phosphatase     ()  U/L


 


Troponin I  < 0.012  < 0.012  < 0.012  (0.000-0.034)  ng/mL


 


Serum Total Protein     (6.3-8.2)  g/dL


 


Albumin     (3.5-5.0)  g/dL


 


Influenza Type A Ag     (NEGATIVE)  


 


Influenza Type B Ag     (NEGATIVE)  


 


RSV (PCR)     (Negative)  


 


SARS-CoV-2 (PCR)     (NEGATIVE)  














  01/26/22 01/27/22 01/27/22 Range/Units





  21:00 04:57 04:57 


 


WBC    16.8 H  (4.0-10.5)  K/mm3


 


RBC    5.02  (4.1-5.6)  M/mm3


 


Hgb    15.2  (12.5-18.0)  gm/dl


 


Hct    44.5  (42-50)  %


 


MCV    88.6  ()  fl


 


MCH    30.3  (26-32)  pg


 


MCHC    34.2  (32-36)  g/dl


 


RDW    13.3  (11.5-14.0)  %


 


Plt Count    164  (150-450)  K/mm3


 


MPV    11.9 H  (7.5-11.0)  fl


 


Gran %     (36.0-66.0)  %


 


Eos # (Auto)     (0-0.5)  


 


Absolute Lymphs (auto)     (1.0-4.6)  


 


Absolute Monos (auto)     (0.0-1.3)  


 


Lymphocytes %     (24.0-44.0)  %


 


Monocytes %     (0.0-12.0)  %


 


Eosinophils %     (0.00-5.0)  %


 


Basophils %     (0.0-0.4)  %


 


Absolute Granulocytes     (1.4-6.9)  


 


Basophils #     (0-0.4)  


 


D-Dimer     (215-500)  ng/mL


 


Sodium     (137-145)  mmol/L


 


Potassium  3.0 L*    (3.5-5.1)  mmol/L


 


Chloride     ()  mmol/L


 


Carbon Dioxide     (22-30)  mmol/L


 


Anion Gap     (5-15)  MEQ/L


 


BUN     (9-20)  mg/dL


 


Creatinine     (0.66-1.25)  mg/dL


 


Estimated GFR     ML/MIN


 


Glucose     ()  mg/dL


 


Calcium     (8.4-10.2)  mg/dL


 


Magnesium     (1.6-2.3)  mg/dL


 


Total Bilirubin     (0.2-1.3)  mg/dL


 


AST     (17-59)  U/L


 


ALT     (0-50)  U/L


 


Alkaline Phosphatase     ()  U/L


 


Troponin I   < 0.012   (0.000-0.034)  ng/mL


 


Serum Total Protein     (6.3-8.2)  g/dL


 


Albumin     (3.5-5.0)  g/dL


 


Influenza Type A Ag     (NEGATIVE)  


 


Influenza Type B Ag     (NEGATIVE)  


 


RSV (PCR)     (Negative)  


 


SARS-CoV-2 (PCR)     (NEGATIVE)  














  01/27/22 Range/Units





  04:57 


 


WBC   (4.0-10.5)  K/mm3


 


RBC   (4.1-5.6)  M/mm3


 


Hgb   (12.5-18.0)  gm/dl


 


Hct   (42-50)  %


 


MCV   ()  fl


 


MCH   (26-32)  pg


 


MCHC   (32-36)  g/dl


 


RDW   (11.5-14.0)  %


 


Plt Count   (150-450)  K/mm3


 


MPV   (7.5-11.0)  fl


 


Gran %   (36.0-66.0)  %


 


Eos # (Auto)   (0-0.5)  


 


Absolute Lymphs (auto)   (1.0-4.6)  


 


Absolute Monos (auto)   (0.0-1.3)  


 


Lymphocytes %   (24.0-44.0)  %


 


Monocytes %   (0.0-12.0)  %


 


Eosinophils %   (0.00-5.0)  %


 


Basophils %   (0.0-0.4)  %


 


Absolute Granulocytes   (1.4-6.9)  


 


Basophils #   (0-0.4)  


 


D-Dimer   (215-500)  ng/mL


 


Sodium  139  D  (137-145)  mmol/L


 


Potassium  3.6  (3.5-5.1)  mmol/L


 


Chloride  102  ()  mmol/L


 


Carbon Dioxide  27  (22-30)  mmol/L


 


Anion Gap  12.7  (5-15)  MEQ/L


 


BUN  18  (9-20)  mg/dL


 


Creatinine  0.67  (0.66-1.25)  mg/dL


 


Estimated GFR  > 60.0  ML/MIN


 


Glucose  172 H  ()  mg/dL


 


Calcium  8.4  (8.4-10.2)  mg/dL


 


Magnesium   (1.6-2.3)  mg/dL


 


Total Bilirubin  0.70  (0.2-1.3)  mg/dL


 


AST  22  (17-59)  U/L


 


ALT  20  (0-50)  U/L


 


Alkaline Phosphatase  48  ()  U/L


 


Troponin I   (0.000-0.034)  ng/mL


 


Serum Total Protein  6.5  (6.3-8.2)  g/dL


 


Albumin  3.2 L  (3.5-5.0)  g/dL


 


Influenza Type A Ag   (NEGATIVE)  


 


Influenza Type B Ag   (NEGATIVE)  


 


RSV (PCR)   (Negative)  


 


SARS-CoV-2 (PCR)   (NEGATIVE)  











Radiology Exams: 


                              Radiology Procedures











 Category Date Time Status


 


 CHEST WITH CONTRAST [CT] Stat Exams  01/26/22 12:16 Completed











Multi-Disciplinary Progress Notes: 


                        Multi-Disciplinary Progress Notes





01/26/22 16:52 Pharmacy Note by Lowell Faria


Pharmacokinetic dosing service    


Date:     01/26/22           Time: 1700





====================


Objective:


====================


Patient: Miguel Angel Burnette  Floor: 102      Age: 45 yo


Serum creatinine:  0.79 mg/dL     Height: 67 Inches     Weight (kg): 170





Diagnosis:    Pneumonia


Relevant medical/social history:


Cultures and sensitivities:   PENDING


Other labs:   


====================


Assessment:


====================


IBW (kg): 66.10     Dosing wt(kg): 170     


Estimated Creatinine clearance (ml/min): 106.9        CRCL method: Cockcroft and

 Gault using ibw(default).





Drug selected: Vancomycin    Loading dose (mg):   0 


Vd (liters): 136.0   (factor used: 0.8 L/kg)  Ray (hr-1): 0.093     Half life 

(hrs):  7.45 





Recommended dose:  2000 mg     Interval: 8 hrs     Infusion time (hrs): 2.0


Predicted peak (mcg/mL):  25.6      Predicted trough  (mcg/mL): 14.65


    Total body weight is being used for vancomycin dosing. 





Renal function is stable [ ] /unstable [ ]





====================


Recommendations:


====================


Give Vancomycin  2000 mg  q 8 hrs with an expected Cpeak of 25.6 mcg/ml and an 

expected Ctrough of 14.65 mcg/ml





Renal dosing of other antibiotics (review renal dosing of other medications and 

list guidelines here):   ZOSYN





Thank you for the consult, will continue to follow.





Signature:   EMELINA





   ** Electronically signed by Lowell Faria on 01/26/22 16:52 **


Initialized on 01/26/22 16:52 - END OF NOTE

















Assessment/Plan


(1) Pneumonia


Current Visit: Yes   Status: Acute   


Assessment & Plan: 


continue vanc/zosyn, wbc improving. if continues to improve might be ready for 

discharge tomorrow.


Code(s): J18.9 - PNEUMONIA, UNSPECIFIED ORGANISM   





(2) Hypoxia


Current Visit: Yes   Status: Acute   Code(s): R09.02 - HYPOXEMIA

## 2022-01-28 VITALS — DIASTOLIC BLOOD PRESSURE: 87 MMHG | HEART RATE: 66 BPM | SYSTOLIC BLOOD PRESSURE: 129 MMHG | OXYGEN SATURATION: 95 %

## 2022-01-28 LAB
ANION GAP SERPL CALC-SCNC: 10.3 MEQ/L (ref 5–15)
ANISOCYTOSIS BLD QL: (no result)
BUN SERPL-MCNC: 21 MG/DL (ref 9–20)
CALCIUM SPEC-MCNC: 7.7 MG/DL (ref 8.4–10.2)
CELLS COUNTED: 100
CHLORIDE SERPL-SCNC: 105 MMOL/L (ref 98–107)
CO2 SERPL-SCNC: 29 MMOL/L (ref 22–30)
CREAT SERPL-MCNC: 0.69 MG/DL (ref 0.66–1.25)
GFR SERPLBLD BASED ON 1.73 SQ M-ARVRAT: > 60 ML/MIN
GLUCOSE SERPL-MCNC: 132 MG/DL (ref 74–106)
HCT VFR BLD AUTO: 42.5 % (ref 42–50)
HGB BLD-MCNC: 14.3 GM/DL (ref 12.5–18)
MAGNESIUM SERPL-MCNC: 2.2 MG/DL (ref 1.6–2.3)
MANUAL DIF COMMENT BLD-IMP: (no result)
MCH RBC QN AUTO: 30 PG (ref 26–32)
MCHC RBC AUTO-ENTMCNC: 33.6 G/DL (ref 32–36)
PLATELET # BLD AUTO: 169 K/MM3 (ref 150–450)
POIKILOCYTOSIS BLD QL SMEAR: (no result)
POTASSIUM SERPLBLD-SCNC: 3.5 MMOL/L (ref 3.5–5.1)
RBC # BLD AUTO: 4.76 M/MM3 (ref 4.1–5.6)
SODIUM SERPL-SCNC: 140 MMOL/L (ref 137–145)
WBC # BLD AUTO: 13.7 K/MM3 (ref 4–10.5)

## 2022-01-28 RX ADMIN — CEFEPIME HYDROCHLORIDE SCH MLS/HR: 2 INJECTION, POWDER, FOR SOLUTION INTRAVENOUS at 06:21

## 2022-01-28 RX ADMIN — CEFEPIME HYDROCHLORIDE SCH MLS/HR: 2 INJECTION, POWDER, FOR SOLUTION INTRAVENOUS at 00:32

## 2022-01-28 RX ADMIN — VANCOMYCIN SCH MLS/HR: 2 INJECTION, SOLUTION INTRAVENOUS at 06:55
